# Patient Record
Sex: MALE | Race: BLACK OR AFRICAN AMERICAN | NOT HISPANIC OR LATINO
[De-identification: names, ages, dates, MRNs, and addresses within clinical notes are randomized per-mention and may not be internally consistent; named-entity substitution may affect disease eponyms.]

---

## 2017-07-11 ENCOUNTER — APPOINTMENT (OUTPATIENT)
Dept: HEART AND VASCULAR | Facility: CLINIC | Age: 72
End: 2017-07-11

## 2017-07-25 ENCOUNTER — APPOINTMENT (OUTPATIENT)
Dept: HEART AND VASCULAR | Facility: CLINIC | Age: 72
End: 2017-07-25

## 2017-08-08 ENCOUNTER — APPOINTMENT (OUTPATIENT)
Dept: HEART AND VASCULAR | Facility: CLINIC | Age: 72
End: 2017-08-08
Payer: MEDICARE

## 2017-08-08 ENCOUNTER — NON-APPOINTMENT (OUTPATIENT)
Age: 72
End: 2017-08-08

## 2017-08-08 VITALS
HEART RATE: 76 BPM | SYSTOLIC BLOOD PRESSURE: 138 MMHG | RESPIRATION RATE: 24 BRPM | BODY MASS INDEX: 23.19 KG/M2 | WEIGHT: 146 LBS | DIASTOLIC BLOOD PRESSURE: 100 MMHG | HEIGHT: 66.5 IN

## 2017-08-08 DIAGNOSIS — Z86.79 PERSONAL HISTORY OF OTHER DISEASES OF THE CIRCULATORY SYSTEM: ICD-10-CM

## 2017-08-08 DIAGNOSIS — F10.10 ALCOHOL ABUSE, UNCOMPLICATED: ICD-10-CM

## 2017-08-08 DIAGNOSIS — N18.2 CHRONIC KIDNEY DISEASE, STAGE 2 (MILD): ICD-10-CM

## 2017-08-08 DIAGNOSIS — J43.8 OTHER EMPHYSEMA: ICD-10-CM

## 2017-08-08 DIAGNOSIS — Z86.39 PERSONAL HISTORY OF OTHER ENDOCRINE, NUTRITIONAL AND METABOLIC DISEASE: ICD-10-CM

## 2017-08-08 DIAGNOSIS — N40.0 BENIGN PROSTATIC HYPERPLASIA WITHOUT LOWER URINARY TRACT SYMPMS: ICD-10-CM

## 2017-08-08 PROCEDURE — 99214 OFFICE O/P EST MOD 30 MIN: CPT | Mod: 25

## 2017-08-08 PROCEDURE — 93320 DOPPLER ECHO COMPLETE: CPT

## 2017-08-08 PROCEDURE — 93325 DOPPLER ECHO COLOR FLOW MAPG: CPT

## 2017-08-08 PROCEDURE — 93000 ELECTROCARDIOGRAM COMPLETE: CPT | Mod: 59

## 2017-08-08 PROCEDURE — 93351 STRESS TTE COMPLETE: CPT

## 2017-08-08 PROCEDURE — 93976 VASCULAR STUDY: CPT

## 2017-08-15 ENCOUNTER — APPOINTMENT (OUTPATIENT)
Dept: HEART AND VASCULAR | Facility: CLINIC | Age: 72
End: 2017-08-15

## 2017-09-26 ENCOUNTER — APPOINTMENT (OUTPATIENT)
Dept: HEART AND VASCULAR | Facility: CLINIC | Age: 72
End: 2017-09-26
Payer: MEDICARE

## 2017-09-26 VITALS
HEIGHT: 66 IN | RESPIRATION RATE: 20 BRPM | SYSTOLIC BLOOD PRESSURE: 136 MMHG | DIASTOLIC BLOOD PRESSURE: 84 MMHG | HEART RATE: 78 BPM

## 2017-09-26 PROCEDURE — 99214 OFFICE O/P EST MOD 30 MIN: CPT

## 2017-09-26 RX ORDER — HYDROCHLOROTHIAZIDE 25 MG/1
25 TABLET ORAL
Refills: 0 | Status: DISCONTINUED | COMMUNITY
End: 2017-09-26

## 2018-03-27 ENCOUNTER — APPOINTMENT (OUTPATIENT)
Dept: HEART AND VASCULAR | Facility: CLINIC | Age: 73
End: 2018-03-27

## 2018-06-12 ENCOUNTER — APPOINTMENT (OUTPATIENT)
Dept: HEART AND VASCULAR | Facility: CLINIC | Age: 73
End: 2018-06-12
Payer: MEDICARE

## 2018-06-12 VITALS — SYSTOLIC BLOOD PRESSURE: 144 MMHG | DIASTOLIC BLOOD PRESSURE: 88 MMHG | HEART RATE: 86 BPM | RESPIRATION RATE: 16 BRPM

## 2018-06-12 PROCEDURE — 99214 OFFICE O/P EST MOD 30 MIN: CPT

## 2018-12-11 ENCOUNTER — APPOINTMENT (OUTPATIENT)
Dept: HEART AND VASCULAR | Facility: CLINIC | Age: 73
End: 2018-12-11

## 2018-12-14 ENCOUNTER — APPOINTMENT (OUTPATIENT)
Dept: ORTHOPEDIC SURGERY | Facility: CLINIC | Age: 73
End: 2018-12-14
Payer: MEDICARE

## 2018-12-14 VITALS — WEIGHT: 150 LBS | BODY MASS INDEX: 24.11 KG/M2 | HEIGHT: 66 IN

## 2018-12-14 DIAGNOSIS — S82.832A OTHER FRACTURE OF UPPER AND LOWER END OF LEFT FIBULA, INITIAL ENCOUNTER FOR CLOSED FRACTURE: ICD-10-CM

## 2018-12-14 PROCEDURE — 99203 OFFICE O/P NEW LOW 30 MIN: CPT

## 2018-12-14 PROCEDURE — 73600 X-RAY EXAM OF ANKLE: CPT | Mod: LT

## 2022-05-09 ENCOUNTER — TRANSCRIPTION ENCOUNTER (OUTPATIENT)
Age: 77
End: 2022-05-09

## 2022-05-10 ENCOUNTER — APPOINTMENT (OUTPATIENT)
Dept: PULMONOLOGY | Facility: CLINIC | Age: 77
End: 2022-05-10

## 2022-06-19 ENCOUNTER — TRANSCRIPTION ENCOUNTER (OUTPATIENT)
Age: 77
End: 2022-06-19

## 2022-06-29 ENCOUNTER — TRANSCRIPTION ENCOUNTER (OUTPATIENT)
Age: 77
End: 2022-06-29

## 2022-07-20 ENCOUNTER — APPOINTMENT (OUTPATIENT)
Dept: PULMONOLOGY | Facility: CLINIC | Age: 77
End: 2022-07-20

## 2022-09-08 ENCOUNTER — APPOINTMENT (OUTPATIENT)
Dept: PULMONOLOGY | Facility: CLINIC | Age: 77
End: 2022-09-08

## 2022-09-08 VITALS
BODY MASS INDEX: 22.02 KG/M2 | HEIGHT: 66 IN | TEMPERATURE: 97 F | OXYGEN SATURATION: 98 % | WEIGHT: 137 LBS | SYSTOLIC BLOOD PRESSURE: 140 MMHG | DIASTOLIC BLOOD PRESSURE: 80 MMHG | HEART RATE: 77 BPM

## 2022-09-08 PROCEDURE — 99204 OFFICE O/P NEW MOD 45 MIN: CPT

## 2022-09-08 NOTE — HISTORY OF PRESENT ILLNESS
[FreeTextEntry1] : Evaluation for abnormal CAT scan and chest [de-identified] : This is a 77-year-old male who is a poor historian. He smoked as a young man but states he hasn't smoked for 50 years. He apparently had chest surgery several years ago in [place]. This may have been a left lower lobectomy. He is unsure of the details. He is not sure what they found. In April of this year he was hospitalized at [facility]. He is unclear as to why he was in the hospital. Apparently he had a CAT scan of the chest which revealed a 5 cm fluid-filled cavity in the right midlung field adjacent to the minor fissure. He apparently was not given antibiotics. He is unclear as to what happened since then. He complains of mucus in his chest and difficulty raising mucus. He gets short of breath walking up inclines. He occasionally wheezes. He would like medication for the mucus in his chest. He again is a poor historian unable to give me any accurate history as to what gone on since April. Past medical history hypertension. Medications he does not know the name. He denies alcohol use. He is a retired cook.

## 2022-09-08 NOTE — ASSESSMENT
[FreeTextEntry1] : Patient is a poor historian. I reviewed the CAT scan dated April 9, 2022. There is a 5 cm fluid-filled cavity adjacent to the minor fissure on the right. I doubt this represents an infectious process. Patient clearly requires a repeat CAT scan. In terms of his complaint chronic mucus production I will try using breo and see if that helps him. He will also be referred for PFT testing to better evaluate lung function. I will see patient again in one month for followup at that time.

## 2022-09-27 ENCOUNTER — RESULT REVIEW (OUTPATIENT)
Age: 77
End: 2022-09-27

## 2022-10-10 ENCOUNTER — APPOINTMENT (OUTPATIENT)
Dept: PULMONOLOGY | Facility: CLINIC | Age: 77
End: 2022-10-10

## 2022-10-10 VITALS
HEART RATE: 95 BPM | SYSTOLIC BLOOD PRESSURE: 130 MMHG | BODY MASS INDEX: 22.02 KG/M2 | HEIGHT: 66 IN | TEMPERATURE: 96.5 F | DIASTOLIC BLOOD PRESSURE: 80 MMHG | OXYGEN SATURATION: 98 % | WEIGHT: 137 LBS

## 2022-10-10 DIAGNOSIS — R93.89 ABNORMAL FINDINGS ON DIAGNOSTIC IMAGING OF OTHER SPECIFIED BODY STRUCTURES: ICD-10-CM

## 2022-10-10 PROCEDURE — 99214 OFFICE O/P EST MOD 30 MIN: CPT

## 2022-10-10 RX ORDER — UMECLIDINIUM BROMIDE AND VILANTEROL TRIFENATATE 62.5; 25 UG/1; UG/1
62.5-25 POWDER RESPIRATORY (INHALATION)
Qty: 1 | Refills: 3 | Status: ACTIVE | COMMUNITY
Start: 2022-10-10 | End: 1900-01-01

## 2022-10-10 NOTE — ASSESSMENT
[FreeTextEntry1] : Patient with marked resolution of the cavitary lesion in the right midlung field. The etiology of this lesion remains unclear but it has markedly improved. The restriction on PFT testing may be due to 2 prior chest surgery with a possible left lower lobectomy. Patient is unclear of the details of this surgery. The persistent symptom of mucus in the throat region and some unclear etiology. Will try anoro inhalation. Patient may also consider an ENT evaluation. Patient is to return to see me in 2 months. We'll recommend a repeat CAT scan in 6 months.

## 2022-10-10 NOTE — HISTORY OF PRESENT ILLNESS
[FreeTextEntry1] : One followup CAT scan and shortness of breath [de-identified] : Patient had repeat CAT scan of the chest which reveals marked resolution of the 5 cm cavitary lesion in the right midlung field. Patient is on Breo. PFT reveals moderate restriction with a marked reduction in diffusion capacity to 47% of predicted. Patient has a history of left chest surgery which may be a left lower lobectomy. Patient is unclear of the details of the surgery. He states it was for a spot in the lung but was not tense. He states it was done in [facility] but he doesn't know exactly where. Patient is again a poor historian. He continues to complain of mucus in his throat region. He denies excess cough. He denies nasal congestion. He still has moderate shortness of breath walking a block.

## 2022-12-12 ENCOUNTER — APPOINTMENT (OUTPATIENT)
Dept: PULMONOLOGY | Facility: CLINIC | Age: 77
End: 2022-12-12

## 2022-12-12 VITALS
BODY MASS INDEX: 22.6 KG/M2 | DIASTOLIC BLOOD PRESSURE: 60 MMHG | TEMPERATURE: 97.5 F | HEART RATE: 89 BPM | HEIGHT: 67 IN | SYSTOLIC BLOOD PRESSURE: 150 MMHG | WEIGHT: 144 LBS | RESPIRATION RATE: 18 BRPM | OXYGEN SATURATION: 96 %

## 2022-12-12 DIAGNOSIS — R06.02 SHORTNESS OF BREATH: ICD-10-CM

## 2022-12-12 PROCEDURE — 99214 OFFICE O/P EST MOD 30 MIN: CPT

## 2022-12-12 NOTE — HISTORY OF PRESENT ILLNESS
[FreeTextEntry1] : Mucous in the throat region. [de-identified] : Patient continues to complain of mucus in the throat region. He has an occasional cough to do a tickle in her throat. He denies nasal complaints. Of note the patient is on lisinopril for blood pressure. He continues to complain of one block dyspnea on exertion. There is no obvious change with anoro. He denies weight loss or anorexia. His main complaint is her feeling of mucus in the throat region. It's unclear if this started prior to the use of lisinopril.

## 2022-12-12 NOTE — ASSESSMENT
[FreeTextEntry1] : I wonder if the patient's throat complaints are due to the use of lisinopril. The patient will be seen in ENT the end of the week. I would consider changing lisinopril to a different agent. Patient will return in March. He requires a repeat CAT scan of the chest in March or April of next year.

## 2022-12-27 ENCOUNTER — APPOINTMENT (OUTPATIENT)
Dept: HEART AND VASCULAR | Facility: CLINIC | Age: 77
End: 2022-12-27

## 2022-12-27 ENCOUNTER — NON-APPOINTMENT (OUTPATIENT)
Age: 77
End: 2022-12-27

## 2022-12-27 VITALS
DIASTOLIC BLOOD PRESSURE: 89 MMHG | TEMPERATURE: 98 F | BODY MASS INDEX: 23.39 KG/M2 | SYSTOLIC BLOOD PRESSURE: 148 MMHG | OXYGEN SATURATION: 97 % | RESPIRATION RATE: 16 BRPM | WEIGHT: 149 LBS | HEIGHT: 67 IN | HEART RATE: 72 BPM

## 2022-12-27 DIAGNOSIS — R00.2 PALPITATIONS: ICD-10-CM

## 2022-12-27 PROCEDURE — 99214 OFFICE O/P EST MOD 30 MIN: CPT | Mod: 25

## 2022-12-27 PROCEDURE — 93000 ELECTROCARDIOGRAM COMPLETE: CPT

## 2022-12-27 RX ORDER — ATORVASTATIN CALCIUM 80 MG/1
TABLET, FILM COATED ORAL
Refills: 0 | Status: DISCONTINUED | COMMUNITY
End: 2022-12-27

## 2022-12-27 RX ORDER — AMOXICILLIN 500 MG/1
CAPSULE ORAL
Refills: 0 | Status: DISCONTINUED | COMMUNITY
End: 2022-12-27

## 2022-12-27 NOTE — PHYSICAL EXAM
[General Appearance - Well Developed] : well developed [Normal Appearance] : normal appearance [Well Groomed] : well groomed [General Appearance - Well Nourished] : well nourished [No Deformities] : no deformities [General Appearance - In No Acute Distress] : no acute distress [Normal Conjunctiva] : the conjunctiva exhibited no abnormalities [Eyelids - No Xanthelasma] : the eyelids demonstrated no xanthelasmas [Normal Oral Mucosa] : normal oral mucosa [No Oral Pallor] : no oral pallor [No Oral Cyanosis] : no oral cyanosis [Normal Jugular Venous A Waves Present] : normal jugular venous A waves present [Normal Jugular Venous V Waves Present] : normal jugular venous V waves present [No Jugular Venous Bolivar A Waves] : no jugular venous bolivar A waves [Respiration, Rhythm And Depth] : normal respiratory rhythm and effort [Exaggerated Use Of Accessory Muscles For Inspiration] : no accessory muscle use [Auscultation Breath Sounds / Voice Sounds] : lungs were clear to auscultation bilaterally [Heart Rate And Rhythm] : heart rate and rhythm were normal [Heart Sounds] : normal S1 and S2 [Murmurs] : no murmurs present [Abdomen Soft] : soft [Abdomen Tenderness] : non-tender [Abdomen Mass (___ Cm)] : no abdominal mass palpated [Nail Clubbing] : no clubbing of the fingernails [Cyanosis, Localized] : no localized cyanosis [Petechial Hemorrhages (___cm)] : no petechial hemorrhages [] : no ischemic changes [Oriented To Time, Place, And Person] : oriented to person, place, and time [Impaired Insight] : insight and judgment were intact [Affect] : the affect was normal [Mood] : the mood was normal

## 2022-12-27 NOTE — DISCUSSION/SUMMARY
[Hyperlipidemia] : hyperlipidemia [Hypertension] : hypertension [Stable] : stable [Possible Cardiac Ischemia (Intermd Prob)] : possible cardiac ischemia (intermediate probability) [Echocardiogram] : echocardiogram [Stress Echocardiogram] : stress echocardiogram [Thyroid Function Tests] : thyroid function tests [Holter Monitor] : a Holter monitor [de-identified] : dyspnea on exertion [de-identified] : elevated [de-identified] : ? thumping sensation in chest [de-identified] : 1 week jolenetch [FreeTextEntry4] : Ascending aortic aneurysm - stable as of Oct 2022. Patient to bring in medications so we can have an accurate list of medications [FreeTextEntry3] : after testing completed

## 2022-12-27 NOTE — REASON FOR VISIT
[Follow-Up - Clinic] : a clinic follow-up of [FreeTextEntry1] : 77 year old male with past medical history of HTN, partial lobectomy October 2013. Last seen in 2018. Here for followup. He has had a productive whitish cough for many years. He has occasional dyspnea on exertion.  He denies chest pain, dizziness, lightheadness, LOC. Occasional thumping sensation in chest when hears something loud. Unclear of medication list. \par \par EKG Oct 2022" NSR at 89 bpm with RBBB. LVH - no change from prior\par \par Labs 03/23/2022: GFR 53; Cr 1.29; CBC WNL; rest of CMP WNL\par \par EXSE  Aug 8th 2017: 6 min; No EKG changes; No WMA; Resting echo: Normal LVEF 55%; Mild to moderate AR; Borderline dilated aortic root 4.0 cm; No other significant valvular abnormalities. \par U/s Abd: No abd Aortic aneurysm; Normal Abdominal aorta dimensions. \par \par EKG 08/2017: NSR at 76 bpm with RBBB. LVH - no change from prior\par \par Oct 2022 CT: No change in diffuse tortuousity and ectasia of the aorta with fusiform aneurysmal dilatation of the ascending aorta up to 4.6 cm AP. CAT scan of the chest which reveals marked resolution of the 5 cm cavitary lesion in the right midlung field. Patient is on Breo. To get repeat CT scan in 6 months per Pulm. \par \par PFT reveals moderate restriction with a marked reduction in diffusion capacity to 47% of predicted\par \par Of note, CT chest states mild dilatation of thoracic ascending aorta (4.4 cm); however PMD notes state abd aortic aneurysm.\par \par Labs June 2017: A1C 5.6; Total chol 141; ; HDL 45 LDL 61; CMP WNL\par \par EXSE Oct 2013: Exercised for 8 minutes and 12 seconds on a Thang protocol. No significant EKG changes noted and no wall motion abnormalities noted on stress echocardiogram. \par \par Resting echocardiogram showed Mild to moderate Aortic Regurgitation (ERO 0.04 sq cm), RF 12%, and RVol of 9 mL, VC 0.3 cm, Mild TR, Mild MR, No ND, PASP 33 mmHg, and Grade 1 Diastolic Dysfunction. Normal LV systolic function with LVEF estimated to be 55-60%.

## 2023-01-18 ENCOUNTER — APPOINTMENT (OUTPATIENT)
Dept: HEART AND VASCULAR | Facility: CLINIC | Age: 78
End: 2023-01-18
Payer: MEDICARE

## 2023-01-18 VITALS
BODY MASS INDEX: 23.39 KG/M2 | HEIGHT: 67 IN | OXYGEN SATURATION: 98 % | WEIGHT: 149 LBS | SYSTOLIC BLOOD PRESSURE: 142 MMHG | HEART RATE: 85 BPM | DIASTOLIC BLOOD PRESSURE: 90 MMHG

## 2023-01-18 PROCEDURE — 93325 DOPPLER ECHO COLOR FLOW MAPG: CPT

## 2023-01-18 PROCEDURE — 93320 DOPPLER ECHO COMPLETE: CPT

## 2023-01-18 PROCEDURE — 93351 STRESS TTE COMPLETE: CPT

## 2023-01-24 ENCOUNTER — APPOINTMENT (OUTPATIENT)
Dept: HEART AND VASCULAR | Facility: CLINIC | Age: 78
End: 2023-01-24
Payer: MEDICARE

## 2023-01-24 VITALS
DIASTOLIC BLOOD PRESSURE: 84 MMHG | BODY MASS INDEX: 22.6 KG/M2 | WEIGHT: 144 LBS | HEIGHT: 67 IN | OXYGEN SATURATION: 98 % | SYSTOLIC BLOOD PRESSURE: 146 MMHG | TEMPERATURE: 97.6 F | HEART RATE: 84 BPM | RESPIRATION RATE: 16 BRPM

## 2023-01-24 PROCEDURE — 99214 OFFICE O/P EST MOD 30 MIN: CPT

## 2023-01-24 RX ORDER — ISONIAZID 300 MG/1
300 TABLET ORAL
Refills: 0 | Status: DISCONTINUED | COMMUNITY
End: 2023-01-24

## 2023-01-24 RX ORDER — HYDROCHLOROTHIAZIDE 12.5 MG/1
TABLET ORAL
Refills: 0 | Status: DISCONTINUED | COMMUNITY
End: 2023-01-24

## 2023-01-24 NOTE — REASON FOR VISIT
[Follow-Up - Clinic] : a clinic follow-up of [FreeTextEntry1] : 77 year old male with past medical history of HTN, partial lobectomy October 2013. Here for followup after recent testing. EXSE/Zio XT reviewed. He feels well from a [facility] standpoint. Just complains of mucus buildup in throat. To get cataract surgery Jan 30 and Feb 15th. \par \par ZioXT 12/27/2022 - 12/30/2022: SR at average HR of 79 bpm with RBBB. Occasional SVE/VE. \par \par EXSE 01/18/2023: Thang protocol 4:00 min. No EKG changes and No WMA on post exercise images. Reached 104% of MPHR. \par TTE 01/18/2023: LVEF 51%. Mild LVH. Normal RV size/systolic function. Mild to moderate AR. Mildly dilated Aortic root measuring 3.9 cm and mildly dilated asending aorta measuring 3.8 cm. RVSP 23 mm Hg. Trace TR. Trace WV. \par \par EKG Oct 2022: NSR at 89 bpm with RBBB. LVH - no change from prior\par \par Labs 03/23/2022: GFR 53; Cr 1.29; CBC WNL; rest of CMP WNL\par \par EXSE Aug 8th 2017: 6 min; No EKG changes; No WMA; Resting echo: Normal LVEF 55%; Mild to moderate AR; Borderline dilated aortic root 4.0 cm; No other significant valvular abnormalities. \par U/s Abd: No abd Aortic aneurysm; Normal Abdominal aorta dimensions. \par \par EKG 08/2017: NSR at 76 bpm with RBBB. LVH - no change from prior\par \par Oct 2022 CT: No change in diffuse tortuousity and ectasia of the aorta with fusiform aneurysmal dilatation of the ascending aorta up to 4.6 cm AP. CAT scan of the chest which reveals marked resolution of the 5 cm cavitary lesion in the right midlung field. Patient is on Breo. To get repeat CT scan in 6 months per Pulm. \par \par PFT reveals moderate restriction with a marked reduction in diffusion capacity to 47% of predicted\par \par Of note, CT chest states mild dilatation of thoracic ascending aorta (4.4 cm); however PMD notes state abd aortic aneurysm.\par \par Labs June 2017: A1C 5.6; Total chol 141; ; HDL 45 LDL 61; CMP WNL\par \par EXSE Oct 2013: Exercised for 8 minutes and 12 seconds on a Thang protocol. No significant EKG changes noted and no wall motion abnormalities noted on stress echocardiogram. \par \par Resting echocardiogram showed Mild to moderate Aortic Regurgitation (ERO 0.04 sq cm), RF 12%, and RVol of 9 mL, VC 0.3 cm, Mild TR, Mild MR, No WV, PASP 33 mmHg, and Grade 1 Diastolic Dysfunction. Normal LV systolic function with LVEF estimated to be 55-60%.

## 2023-01-24 NOTE — DISCUSSION/SUMMARY
[Unlikely Cardiac Ischemia (Low Prob.)] : chest pain unlikely to represent cardiac ischemia (low probability) [Hyperlipidemia] : hyperlipidemia [Hypertension] : hypertension [Stable] : stable [Low Sodium Diet] : low sodium diet [___ Month(s)] : in [unfilled] month(s) [de-identified] : resolved [de-identified] : On Atorvastatin 20 mg Po daily [de-identified] : slightly elevated, but acceptable [de-identified] : On Lisinopril and HCTZ - to call with medication dosages tomorrow [de-identified] : resolved.  [FreeTextEntry4] : Ascending aortic aneurysm - stable. Patient to call tomorrow with his aid Karley to clarify medications [FreeTextEntry1] : [facility] stable.

## 2023-01-25 ENCOUNTER — NON-APPOINTMENT (OUTPATIENT)
Age: 78
End: 2023-01-25

## 2023-01-25 RX ORDER — LOSARTAN POTASSIUM 50 MG/1
50 TABLET, FILM COATED ORAL DAILY
Refills: 0 | Status: ACTIVE | COMMUNITY

## 2023-01-25 RX ORDER — ALBUTEROL 90 MCG
AEROSOL (GRAM) INHALATION
Refills: 0 | Status: ACTIVE | COMMUNITY

## 2023-01-30 ENCOUNTER — HOSPITAL ENCOUNTER (OUTPATIENT)
Dept: HOSPITAL 74 - FASU | Age: 78
Discharge: HOME | End: 2023-01-30
Attending: OPHTHALMOLOGY
Payer: COMMERCIAL

## 2023-01-30 VITALS — DIASTOLIC BLOOD PRESSURE: 89 MMHG | SYSTOLIC BLOOD PRESSURE: 167 MMHG | HEART RATE: 70 BPM

## 2023-01-30 VITALS — RESPIRATION RATE: 18 BRPM | TEMPERATURE: 97.9 F

## 2023-01-30 VITALS — BODY MASS INDEX: 22.5 KG/M2

## 2023-01-30 DIAGNOSIS — H25.12: Primary | ICD-10-CM

## 2023-01-30 PROCEDURE — V2632 POST CHMBR INTRAOCULAR LENS: HCPCS

## 2023-01-30 PROCEDURE — 08RK3JZ REPLACEMENT OF LEFT LENS WITH SYNTHETIC SUBSTITUTE, PERCUTANEOUS APPROACH: ICD-10-PCS | Performed by: STUDENT IN AN ORGANIZED HEALTH CARE EDUCATION/TRAINING PROGRAM

## 2023-01-30 PROCEDURE — 66984 XCAPSL CTRC RMVL W/O ECP: CPT

## 2023-01-30 RX ADMIN — OFLOXACIN SCH DROP: 3 SOLUTION/ DROPS OPHTHALMIC at 09:00

## 2023-01-30 RX ADMIN — TROPICAMIDE SCH DROP: 10 SOLUTION/ DROPS OPHTHALMIC at 09:00

## 2023-01-30 RX ADMIN — OFLOXACIN SCH DROP: 3 SOLUTION/ DROPS OPHTHALMIC at 09:10

## 2023-01-30 RX ADMIN — CYCLOPENTOLATE HYDROCHLORIDE SCH DROP: 10 SOLUTION/ DROPS OPHTHALMIC at 09:00

## 2023-01-30 RX ADMIN — PHENYLEPHRINE HYDROCHLORIDE SCH DROP: 0.25 SPRAY NASAL at 09:10

## 2023-01-30 RX ADMIN — KETOROLAC TROMETHAMINE SCH DROP: 5 SOLUTION OPHTHALMIC at 09:00

## 2023-01-30 RX ADMIN — CYCLOPENTOLATE HYDROCHLORIDE SCH DROP: 10 SOLUTION/ DROPS OPHTHALMIC at 09:10

## 2023-01-30 RX ADMIN — KETOROLAC TROMETHAMINE SCH DROP: 5 SOLUTION OPHTHALMIC at 09:10

## 2023-01-30 RX ADMIN — PHENYLEPHRINE HYDROCHLORIDE SCH DROP: 0.25 SPRAY NASAL at 09:00

## 2023-01-30 RX ADMIN — PHENYLEPHRINE HYDROCHLORIDE SCH DROP: 0.25 SPRAY NASAL at 09:05

## 2023-01-30 RX ADMIN — TROPICAMIDE SCH DROP: 10 SOLUTION/ DROPS OPHTHALMIC at 09:10

## 2023-01-30 RX ADMIN — KETOROLAC TROMETHAMINE SCH DROP: 5 SOLUTION OPHTHALMIC at 09:05

## 2023-01-30 RX ADMIN — TROPICAMIDE SCH DROP: 10 SOLUTION/ DROPS OPHTHALMIC at 09:05

## 2023-01-30 RX ADMIN — OFLOXACIN SCH DROP: 3 SOLUTION/ DROPS OPHTHALMIC at 09:05

## 2023-02-13 ENCOUNTER — HOSPITAL ENCOUNTER (OUTPATIENT)
Dept: HOSPITAL 74 - FASU | Age: 78
Discharge: HOME | End: 2023-02-13
Attending: OPHTHALMOLOGY
Payer: COMMERCIAL

## 2023-02-13 VITALS — TEMPERATURE: 97.9 F

## 2023-02-13 VITALS — BODY MASS INDEX: 22.5 KG/M2

## 2023-02-13 VITALS — RESPIRATION RATE: 18 BRPM | DIASTOLIC BLOOD PRESSURE: 96 MMHG | SYSTOLIC BLOOD PRESSURE: 178 MMHG | HEART RATE: 67 BPM

## 2023-02-13 DIAGNOSIS — H25.11: Primary | ICD-10-CM

## 2023-02-13 PROCEDURE — V2632 POST CHMBR INTRAOCULAR LENS: HCPCS

## 2023-02-13 PROCEDURE — 66984 XCAPSL CTRC RMVL W/O ECP: CPT

## 2023-02-13 PROCEDURE — 08RJ3JZ REPLACEMENT OF RIGHT LENS WITH SYNTHETIC SUBSTITUTE, PERCUTANEOUS APPROACH: ICD-10-PCS | Performed by: OPHTHALMOLOGY

## 2023-02-13 RX ADMIN — PHENYLEPHRINE HYDROCHLORIDE SCH DROP: 0.25 SPRAY NASAL at 09:15

## 2023-02-13 RX ADMIN — OFLOXACIN SCH DROP: 3 SOLUTION/ DROPS OPHTHALMIC at 09:10

## 2023-02-13 RX ADMIN — PHENYLEPHRINE HYDROCHLORIDE SCH DROP: 0.25 SPRAY NASAL at 09:05

## 2023-02-13 RX ADMIN — KETOROLAC TROMETHAMINE SCH DROP: 5 SOLUTION OPHTHALMIC at 09:10

## 2023-02-13 RX ADMIN — KETOROLAC TROMETHAMINE SCH DROP: 5 SOLUTION OPHTHALMIC at 09:05

## 2023-02-13 RX ADMIN — CYCLOPENTOLATE HYDROCHLORIDE SCH DROP: 10 SOLUTION/ DROPS OPHTHALMIC at 09:05

## 2023-02-13 RX ADMIN — PHENYLEPHRINE HYDROCHLORIDE SCH DROP: 0.25 SPRAY NASAL at 09:10

## 2023-02-13 RX ADMIN — TROPICAMIDE SCH DROP: 10 SOLUTION/ DROPS OPHTHALMIC at 09:05

## 2023-02-13 RX ADMIN — OFLOXACIN SCH DROP: 3 SOLUTION/ DROPS OPHTHALMIC at 09:05

## 2023-02-13 RX ADMIN — TROPICAMIDE SCH DROP: 10 SOLUTION/ DROPS OPHTHALMIC at 09:15

## 2023-02-13 RX ADMIN — CYCLOPENTOLATE HYDROCHLORIDE SCH DROP: 10 SOLUTION/ DROPS OPHTHALMIC at 09:10

## 2023-02-13 RX ADMIN — OFLOXACIN SCH DROP: 3 SOLUTION/ DROPS OPHTHALMIC at 09:15

## 2023-02-13 RX ADMIN — KETOROLAC TROMETHAMINE SCH DROP: 5 SOLUTION OPHTHALMIC at 09:15

## 2023-02-13 RX ADMIN — TROPICAMIDE SCH DROP: 10 SOLUTION/ DROPS OPHTHALMIC at 09:10

## 2023-02-13 RX ADMIN — CYCLOPENTOLATE HYDROCHLORIDE SCH DROP: 10 SOLUTION/ DROPS OPHTHALMIC at 09:15

## 2023-03-13 ENCOUNTER — APPOINTMENT (OUTPATIENT)
Dept: PULMONOLOGY | Facility: CLINIC | Age: 78
End: 2023-03-13
Payer: MEDICARE

## 2023-03-13 VITALS
OXYGEN SATURATION: 95 % | HEART RATE: 102 BPM | SYSTOLIC BLOOD PRESSURE: 152 MMHG | DIASTOLIC BLOOD PRESSURE: 98 MMHG | HEIGHT: 67 IN | TEMPERATURE: 96.6 F | WEIGHT: 146 LBS | BODY MASS INDEX: 22.91 KG/M2

## 2023-03-13 PROCEDURE — 99214 OFFICE O/P EST MOD 30 MIN: CPT

## 2023-03-13 NOTE — HISTORY OF PRESENT ILLNESS
[FreeTextEntry1] : Chronic throat irritation. [de-identified] :   Patient is now off lisinopril and has persistent complaint of irritation in his throat with mucus in the throat which she has trouble removing.  It is unclear if he has a true cough.  He denies nasal complaints.  He was referred to ENT but it is unclear if he actually went or not.  He still has shortness of breath walking a block but states it is 2 to problems in his throat.

## 2023-03-13 NOTE — ASSESSMENT
[FreeTextEntry1] :   Patient continues to complain of throat irritation and mucus in the throat.  I do not see an obvious pulmonary etiology of his complaints.  I will try to reach out to ENT to speak to them.  If the patient was not seen he will need to be reevaluated.

## 2023-03-13 NOTE — PHYSICAL EXAM
[No Acute Distress] : no acute distress [Well Nourished] : well nourished [Well Developed] : well developed [Well-Appearing] : well-appearing [Normal Sclera/Conjunctiva] : normal sclera/conjunctiva [PERRL] : pupils equal round and reactive to light [EOMI] : extraocular movements intact [Normal Outer Ear/Nose] : the outer ears and nose were normal in appearance [Normal Oropharynx] : the oropharynx was normal [No JVD] : no jugular venous distention [No Lymphadenopathy] : no lymphadenopathy [Supple] : supple [Thyroid Normal, No Nodules] : the thyroid was normal and there were no nodules present [No Respiratory Distress] : no respiratory distress  [No Accessory Muscle Use] : no accessory muscle use [Clear to Auscultation] : lungs were clear to auscultation bilaterally [Normal Rate] : normal rate  [Regular Rhythm] : with a regular rhythm [Normal S1, S2] : normal S1 and S2 [No Murmur] : no murmur heard [No Carotid Bruits] : no carotid bruits [No Abdominal Bruit] : a ~M bruit was not heard ~T in the abdomen [No Varicosities] : no varicosities [Pedal Pulses Present] : the pedal pulses are present [No Edema] : there was no peripheral edema [No Palpable Aorta] : no palpable aorta [No Extremity Clubbing/Cyanosis] : no extremity clubbing/cyanosis [Soft] : abdomen soft [Non Tender] : non-tender [Non-distended] : non-distended [No Masses] : no abdominal mass palpated [No HSM] : no HSM [Normal Bowel Sounds] : normal bowel sounds [No Focal Deficits] : no focal deficits [Normal Gait] : normal gait [Normal Affect] : the affect was normal [Normal Insight/Judgement] : insight and judgment were intact

## 2023-07-11 ENCOUNTER — APPOINTMENT (OUTPATIENT)
Dept: HEART AND VASCULAR | Facility: CLINIC | Age: 78
End: 2023-07-11

## 2023-08-22 ENCOUNTER — APPOINTMENT (OUTPATIENT)
Dept: HEART AND VASCULAR | Facility: CLINIC | Age: 78
End: 2023-08-22
Payer: MEDICARE

## 2023-08-22 VITALS
DIASTOLIC BLOOD PRESSURE: 85 MMHG | BODY MASS INDEX: 22.6 KG/M2 | TEMPERATURE: 97.6 F | HEART RATE: 80 BPM | HEIGHT: 67 IN | WEIGHT: 144 LBS | RESPIRATION RATE: 16 BRPM | SYSTOLIC BLOOD PRESSURE: 134 MMHG | OXYGEN SATURATION: 97 %

## 2023-08-22 DIAGNOSIS — I71.60 THORACOABDOMINAL AORTIC ANEURYSM, WITHOUT RUPTURE, UNSPECIFIED: ICD-10-CM

## 2023-08-22 PROCEDURE — 99214 OFFICE O/P EST MOD 30 MIN: CPT

## 2023-08-22 NOTE — REASON FOR VISIT
[Follow-Up - Clinic] : a clinic follow-up of [FreeTextEntry1] : 78 year old male with past medical history of HTN, partial lobectomy October 2013. He complains of mucous in his chest. No chest pain. Occasional dyspnea on exertion but thinks mainly from mucous. No orthopnea, PND, edema.  ZioXT 12/27/2022 - 12/30/2022: SR at average HR of 79 bpm with RBBB. Occasional SVE/VE.   EXSE 01/18/2023: Thang protocol 4:00 min. No EKG changes and No WMA on post exercise images. Reached 104% of MPHR.  TTE 01/18/2023: LVEF 51%. Mild LVH. Normal RV size/systolic function. Mild to moderate AR. Mildly dilated Aortic root measuring 3.9 cm and mildly dilated asending aorta measuring 3.8 cm. RVSP 23 mm Hg. Trace TR. Trace UT.   EKG Oct 2022: NSR at 89 bpm with RBBB. LVH - no change from prior  Labs 03/23/2022: GFR 53; Cr 1.29; CBC WNL; rest of CMP WNL  EXSE Aug 8th 2017: 6 min; No EKG changes; No WMA; Resting echo: Normal LVEF 55%; Mild to moderate AR; Borderline dilated aortic root 4.0 cm; No other significant valvular abnormalities.  U/s Abd: No abd Aortic aneurysm; Normal Abdominal aorta dimensions.   EKG 08/2017: NSR at 76 bpm with RBBB. LVH - no change from prior  Oct 2022 CT: No change in diffuse tortuousity and ectasia of the aorta with fusiform aneurysmal dilatation of the ascending aorta up to 4.6 cm AP. CAT scan of the chest which reveals marked resolution of the 5 cm cavitary lesion in the right midlung field. Patient is on Breo. To get repeat CT scan in 6 months per Pulm.   PFT reveals moderate restriction with a marked reduction in diffusion capacity to 47% of predicted  Of note, CT chest states mild dilatation of thoracic ascending aorta (4.4 cm); however PMD notes state abd aortic aneurysm.  Labs June 2017: A1C 5.6; Total chol 141; ; HDL 45 LDL 61; CMP WNL  EXSE Oct 2013: Exercised for 8 minutes and 12 seconds on a Thang protocol. No significant EKG changes noted and no wall motion abnormalities noted on stress echocardiogram.   Resting echocardiogram showed Mild to moderate Aortic Regurgitation (ERO 0.04 sq cm), RF 12%, and RVol of 9 mL, VC 0.3 cm, Mild TR, Mild MR, No UT, PASP 33 mmHg, and Grade 1 Diastolic Dysfunction. Normal LV systolic function with LVEF estimated to be 55-60%.

## 2023-08-22 NOTE — DISCUSSION/SUMMARY
[Unlikely Cardiac Ischemia (Low Prob.)] : chest pain unlikely to represent cardiac ischemia (low probability) [Hyperlipidemia] : hyperlipidemia [Hypertension] : hypertension [Stable] : stable [Low Sodium Diet] : low sodium diet [___ Month(s)] : in [unfilled] month(s) [Diet Modification] : diet modification [Exercise] : exercise [de-identified] : resolved [de-identified] : As per patient, not taking statin.  [de-identified] : slightly elevated, but acceptable [de-identified] : On Losartan 50 mg PO daily [de-identified] : resolved.  [FreeTextEntry4] : Ascending aortic aneurysm - stable. Patient said he had a CT scan in January 2023. Will obtain from PMD. If has not been done, will need CT Aorta to assess stability of aneurysm. Patient to get labs done this week on Wednesday. Will obtain from PMD. Will also give Rx for labs.

## 2024-02-07 ENCOUNTER — RX RENEWAL (OUTPATIENT)
Age: 79
End: 2024-02-07

## 2024-02-07 RX ORDER — UMECLIDINIUM BROMIDE AND VILANTEROL TRIFENATATE 62.5; 25 UG/1; UG/1
62.5-25 POWDER RESPIRATORY (INHALATION)
Qty: 1 | Refills: 5 | Status: ACTIVE | COMMUNITY
Start: 2023-02-21 | End: 1900-01-01

## 2024-03-05 ENCOUNTER — APPOINTMENT (OUTPATIENT)
Dept: HEART AND VASCULAR | Facility: CLINIC | Age: 79
End: 2024-03-05
Payer: MEDICARE

## 2024-03-05 ENCOUNTER — NON-APPOINTMENT (OUTPATIENT)
Age: 79
End: 2024-03-05

## 2024-03-05 VITALS
BODY MASS INDEX: 23.54 KG/M2 | SYSTOLIC BLOOD PRESSURE: 144 MMHG | RESPIRATION RATE: 16 BRPM | HEIGHT: 67 IN | DIASTOLIC BLOOD PRESSURE: 85 MMHG | HEART RATE: 78 BPM | TEMPERATURE: 98.3 F | OXYGEN SATURATION: 98 % | WEIGHT: 150 LBS

## 2024-03-05 PROCEDURE — 93000 ELECTROCARDIOGRAM COMPLETE: CPT

## 2024-03-05 PROCEDURE — 99214 OFFICE O/P EST MOD 30 MIN: CPT | Mod: 25

## 2024-03-05 NOTE — DISCUSSION/SUMMARY
[Unlikely Cardiac Ischemia (Low Prob.)] : chest pain unlikely to represent cardiac ischemia (low probability) [Hyperlipidemia] : hyperlipidemia [Diet Modification] : diet modification [Exercise] : exercise [Hypertension] : hypertension [Stable] : stable [Low Sodium Diet] : low sodium diet [___ Month(s)] : in [unfilled] month(s) [de-identified] : resolved [de-identified] : slightly elevated, but acceptable [de-identified] : As per patient, not taking statin.  [de-identified] : resolved.  [de-identified] : On Losartan 50 mg PO daily [FreeTextEntry4] : Ascending aortic aneurysm - stable. Patient said he had a CT scan in January 2023. Will obtain from PMD. If has not been done, will need CT Aorta to assess stability of aneurysm. Patient to get labs done this week on Wednesday. Will obtain from PMD. Will also give Rx for labs.  [FreeTextEntry1] : Patient needs to see Pulmonologist again. Will help patient make appointment. He will need CT Chest based on last CT Chest.  Had labs done one week ago - Will followup on labs done at [facility].

## 2024-03-05 NOTE — PHYSICAL EXAM
[General Appearance - Well Developed] : well developed [Well Groomed] : well groomed [Normal Appearance] : normal appearance [General Appearance - Well Nourished] : well nourished [General Appearance - In No Acute Distress] : no acute distress [No Deformities] : no deformities [Normal Conjunctiva] : the conjunctiva exhibited no abnormalities [Eyelids - No Xanthelasma] : the eyelids demonstrated no xanthelasmas [Normal Oral Mucosa] : normal oral mucosa [No Oral Pallor] : no oral pallor [Normal Jugular Venous A Waves Present] : normal jugular venous A waves present [No Oral Cyanosis] : no oral cyanosis [No Jugular Venous Bolivar A Waves] : no jugular venous bolivar A waves [Normal Jugular Venous V Waves Present] : normal jugular venous V waves present [Exaggerated Use Of Accessory Muscles For Inspiration] : no accessory muscle use [Respiration, Rhythm And Depth] : normal respiratory rhythm and effort [Heart Rate And Rhythm] : heart rate and rhythm were normal [Auscultation Breath Sounds / Voice Sounds] : lungs were clear to auscultation bilaterally [Murmurs] : no murmurs present [Heart Sounds] : normal S1 and S2 [Abdomen Soft] : soft [Abdomen Tenderness] : non-tender [Nail Clubbing] : no clubbing of the fingernails [Abdomen Mass (___ Cm)] : no abdominal mass palpated [Petechial Hemorrhages (___cm)] : no petechial hemorrhages [Cyanosis, Localized] : no localized cyanosis [Oriented To Time, Place, And Person] : oriented to person, place, and time [] : no ischemic changes [Mood] : the mood was normal [Affect] : the affect was normal [Impaired Insight] : insight and judgment were intact

## 2024-03-05 NOTE — REVIEW OF SYSTEMS
[Negative] : Psychiatric [FreeTextEntry2] : except as above [FreeTextEntry6] : except as above [FreeTextEntry5] : except as above

## 2024-03-05 NOTE — REASON FOR VISIT
[Follow-Up - Clinic] : a clinic follow-up of [FreeTextEntry1] : 78 year old male with past medical history of HTN, partial lobectomy October 2013. He still complains of mucous in his chest. No chest pain. No orthopnea, PND, edema.Has not been seeing Dr. Dos Santos's office.   EKG today: SR at 76 bpm with RBBB - no change from prior.  Labs 08/26/2023: Cr 1.59; GFR 44; Total chol 206; ; HDL 67;  PET/CT Chest: Small left lower lobe nodular density demonstrates intermediate grade activity. It is probably inflammatory but the posttibiliy of lower grade maliganancy cannot be entirely excluded.  ZioXT 12/27/2022 - 12/30/2022: SR at average HR of 79 bpm with RBBB. Occasional SVE/VE.   EXSE 01/18/2023: Thang protocol 4:00 min. No EKG changes and No WMA on post exercise images. Reached 104% of MPHR.  TTE 01/18/2023: LVEF 51%. Mild LVH. Normal RV size/systolic function. Mild to moderate AR. Mildly dilated Aortic root measuring 3.9 cm and mildly dilated asending aorta measuring 3.8 cm. RVSP 23 mm Hg. Trace TR. Trace GA.   EKG Oct 2022: NSR at 89 bpm with RBBB. LVH - no change from prior  Labs 03/23/2022: GFR 53; Cr 1.29; CBC WNL; rest of CMP WNL  EXSE Aug 8th 2017: 6 min; No EKG changes; No WMA; Resting echo: Normal LVEF 55%; Mild to moderate AR; Borderline dilated aortic root 4.0 cm; No other significant valvular abnormalities.  U/s Abd: No abd Aortic aneurysm; Normal Abdominal aorta dimensions.   EKG 08/2017: NSR at 76 bpm with RBBB. LVH - no change from prior  Oct 2022 CT: No change in diffuse tortuousity and ectasia of the aorta with fusiform aneurysmal dilatation of the ascending aorta up to 4.6 cm AP. CAT scan of the chest which reveals marked resolution of the 5 cm cavitary lesion in the right midlung field. Patient is on Breo. To get repeat CT scan in 6 months per Pulm.   PFT reveals moderate restriction with a marked reduction in diffusion capacity to 47% of predicted  Of note, CT chest states mild dilatation of thoracic ascending aorta (4.4 cm); however PMD notes state abd aortic aneurysm.  Labs June 2017: A1C 5.6; Total chol 141; ; HDL 45 LDL 61; CMP WNL  EXSE Oct 2013: Exercised for 8 minutes and 12 seconds on a Thang protocol. No significant EKG changes noted and no wall motion abnormalities noted on stress echocardiogram.   Resting echocardiogram showed Mild to moderate Aortic Regurgitation (ERO 0.04 sq cm), RF 12%, and RVol of 9 mL, VC 0.3 cm, Mild TR, Mild MR, No GA, PASP 33 mmHg, and Grade 1 Diastolic Dysfunction. Normal LV systolic function with LVEF estimated to be 55-60%.

## 2024-03-22 ENCOUNTER — APPOINTMENT (OUTPATIENT)
Dept: PULMONOLOGY | Facility: CLINIC | Age: 79
End: 2024-03-22
Payer: MEDICARE

## 2024-03-22 VITALS
BODY MASS INDEX: 23.54 KG/M2 | OXYGEN SATURATION: 98 % | HEART RATE: 81 BPM | SYSTOLIC BLOOD PRESSURE: 140 MMHG | DIASTOLIC BLOOD PRESSURE: 80 MMHG | WEIGHT: 150 LBS | HEIGHT: 67 IN

## 2024-03-22 DIAGNOSIS — R05.3 CHRONIC COUGH: ICD-10-CM

## 2024-03-22 PROCEDURE — 99214 OFFICE O/P EST MOD 30 MIN: CPT

## 2024-03-22 NOTE — HISTORY OF PRESENT ILLNESS
[TextBox_4] : Patient is a former smoker but quit more than 40 years ago.  He has a history of a left lower lobectomy in 2013 although the details are unclear.  He continues to complain of mucus which is white in his throat and upper chest region.  He has trouble removing the mucus.  He denies real cough.  He just feels mucus is stuck in that area.  I sent the patient to ENT last year and he ended up having a swallow test.  Unfortunately he does not know the results.  He remains on Anoro.  He is continuing to have the same complaint of mucus in the throat and upper chest region.  His last CAT scan as I can tell was 2 years ago.

## 2024-03-22 NOTE — ASSESSMENT
[FreeTextEntry1] : The etiology of this patient's complaint of mucus in his throat and upper chest region is unclear.  Patient does not have a definite cough.  I feel this is likely related to an ENT source.  I will order a CAT scan of the chest as a 2-year follow-up.  Patient will be referred again to ENT and hopefully he will get some answers this time.

## 2024-03-22 NOTE — PHYSICAL EXAM
[Normal Oropharynx] : normal oropharynx [No Acute Distress] : no acute distress [Normal Appearance] : normal appearance [No Neck Mass] : no neck mass [Normal Rate/Rhythm] : normal rate/rhythm [Normal S1, S2] : normal s1, s2 [No Murmurs] : no murmurs [No Resp Distress] : no resp distress [Clear to Auscultation Bilaterally] : clear to auscultation bilaterally [No Abnormalities] : no abnormalities [Benign] : benign [Normal Gait] : normal gait [No Clubbing] : no clubbing [No Cyanosis] : no cyanosis [No Edema] : no edema [FROM] : FROM [Normal Color/ Pigmentation] : normal color/ pigmentation [Oriented x3] : oriented x3 [No Focal Deficits] : no focal deficits [Normal Affect] : normal affect

## 2024-04-18 ENCOUNTER — RESULT REVIEW (OUTPATIENT)
Age: 79
End: 2024-04-18

## 2024-09-12 ENCOUNTER — APPOINTMENT (OUTPATIENT)
Dept: HEART AND VASCULAR | Facility: CLINIC | Age: 79
End: 2024-09-12
Payer: MEDICARE

## 2024-09-12 VITALS
HEIGHT: 67 IN | HEART RATE: 80 BPM | WEIGHT: 150 LBS | DIASTOLIC BLOOD PRESSURE: 96 MMHG | BODY MASS INDEX: 23.54 KG/M2 | OXYGEN SATURATION: 98 % | SYSTOLIC BLOOD PRESSURE: 160 MMHG | RESPIRATION RATE: 16 BRPM

## 2024-09-12 DIAGNOSIS — R06.02 SHORTNESS OF BREATH: ICD-10-CM

## 2024-09-12 DIAGNOSIS — I10 ESSENTIAL (PRIMARY) HYPERTENSION: ICD-10-CM

## 2024-09-12 DIAGNOSIS — R07.9 CHEST PAIN, UNSPECIFIED: ICD-10-CM

## 2024-09-12 PROCEDURE — 99214 OFFICE O/P EST MOD 30 MIN: CPT

## 2024-09-12 PROCEDURE — G2211 COMPLEX E/M VISIT ADD ON: CPT

## 2024-09-12 NOTE — HISTORY OF PRESENT ILLNESS
[FreeTextEntry1] : cardiac f/u notes sob and episodes of CP c/o chronic cough saw pulmonary BP has been elevated

## 2024-09-12 NOTE — DISCUSSION/SUMMARY
[FreeTextEntry1] : stable exam, chart reviewed cp/sob- r/o ischemia, f/u stress echo HTN BP elevated, will start additional med from pcp today ENT eval for cough

## 2024-10-28 ENCOUNTER — APPOINTMENT (OUTPATIENT)
Dept: THORACIC SURGERY | Facility: CLINIC | Age: 79
End: 2024-10-28

## 2024-10-29 ENCOUNTER — NON-APPOINTMENT (OUTPATIENT)
Age: 79
End: 2024-10-29

## 2024-10-30 ENCOUNTER — NON-APPOINTMENT (OUTPATIENT)
Age: 79
End: 2024-10-30

## 2025-01-28 ENCOUNTER — APPOINTMENT (OUTPATIENT)
Dept: PULMONOLOGY | Facility: CLINIC | Age: 80
End: 2025-01-28
Payer: MEDICARE

## 2025-01-28 VITALS
HEIGHT: 67 IN | BODY MASS INDEX: 23.54 KG/M2 | SYSTOLIC BLOOD PRESSURE: 140 MMHG | DIASTOLIC BLOOD PRESSURE: 80 MMHG | OXYGEN SATURATION: 97 % | HEART RATE: 89 BPM | WEIGHT: 150 LBS

## 2025-01-28 DIAGNOSIS — R05.3 CHRONIC COUGH: ICD-10-CM

## 2025-01-28 PROCEDURE — 99214 OFFICE O/P EST MOD 30 MIN: CPT

## 2025-02-03 ENCOUNTER — APPOINTMENT (OUTPATIENT)
Dept: HEART AND VASCULAR | Facility: CLINIC | Age: 80
End: 2025-02-03

## 2025-02-05 ENCOUNTER — APPOINTMENT (OUTPATIENT)
Dept: PULMONOLOGY | Facility: CLINIC | Age: 80
End: 2025-02-05

## 2025-02-24 ENCOUNTER — APPOINTMENT (OUTPATIENT)
Dept: PULMONOLOGY | Facility: CLINIC | Age: 80
End: 2025-02-24
Payer: MEDICARE

## 2025-02-24 PROCEDURE — 94729 DIFFUSING CAPACITY: CPT

## 2025-02-24 PROCEDURE — 94060 EVALUATION OF WHEEZING: CPT

## 2025-02-24 PROCEDURE — 94727 GAS DIL/WSHOT DETER LNG VOL: CPT

## 2025-03-03 ENCOUNTER — APPOINTMENT (OUTPATIENT)
Dept: HEART AND VASCULAR | Facility: CLINIC | Age: 80
End: 2025-03-03

## 2025-03-13 ENCOUNTER — NON-APPOINTMENT (OUTPATIENT)
Age: 80
End: 2025-03-13

## 2025-03-13 ENCOUNTER — APPOINTMENT (OUTPATIENT)
Dept: HEART AND VASCULAR | Facility: CLINIC | Age: 80
End: 2025-03-13
Payer: MEDICARE

## 2025-03-13 VITALS
SYSTOLIC BLOOD PRESSURE: 115 MMHG | BODY MASS INDEX: 23.54 KG/M2 | HEART RATE: 88 BPM | HEIGHT: 67 IN | OXYGEN SATURATION: 97 % | RESPIRATION RATE: 16 BRPM | DIASTOLIC BLOOD PRESSURE: 60 MMHG | WEIGHT: 150 LBS

## 2025-03-13 DIAGNOSIS — R07.9 CHEST PAIN, UNSPECIFIED: ICD-10-CM

## 2025-03-13 PROCEDURE — G2211 COMPLEX E/M VISIT ADD ON: CPT

## 2025-03-13 PROCEDURE — 99214 OFFICE O/P EST MOD 30 MIN: CPT

## 2025-03-13 PROCEDURE — 93000 ELECTROCARDIOGRAM COMPLETE: CPT

## 2025-03-13 RX ORDER — MULTIVITAMIN
TABLET ORAL
Refills: 0 | Status: ACTIVE | COMMUNITY

## 2025-03-20 ENCOUNTER — APPOINTMENT (OUTPATIENT)
Dept: HEART AND VASCULAR | Facility: CLINIC | Age: 80
End: 2025-03-20
Payer: MEDICARE

## 2025-03-20 DIAGNOSIS — I10 ESSENTIAL (PRIMARY) HYPERTENSION: ICD-10-CM

## 2025-03-20 DIAGNOSIS — R06.02 SHORTNESS OF BREATH: ICD-10-CM

## 2025-03-20 PROCEDURE — 93320 DOPPLER ECHO COMPLETE: CPT

## 2025-03-20 PROCEDURE — 93351 STRESS TTE COMPLETE: CPT

## 2025-03-20 PROCEDURE — 93325 DOPPLER ECHO COLOR FLOW MAPG: CPT

## 2025-03-20 PROCEDURE — 99214 OFFICE O/P EST MOD 30 MIN: CPT

## 2025-07-09 ENCOUNTER — APPOINTMENT (OUTPATIENT)
Dept: PULMONOLOGY | Facility: CLINIC | Age: 80
End: 2025-07-09
Payer: MEDICARE

## 2025-07-09 VITALS
OXYGEN SATURATION: 98 % | HEART RATE: 79 BPM | DIASTOLIC BLOOD PRESSURE: 70 MMHG | SYSTOLIC BLOOD PRESSURE: 120 MMHG | WEIGHT: 150 LBS | HEIGHT: 67 IN | BODY MASS INDEX: 23.54 KG/M2

## 2025-07-09 PROCEDURE — 99214 OFFICE O/P EST MOD 30 MIN: CPT

## 2025-08-07 ENCOUNTER — RESULT REVIEW (OUTPATIENT)
Age: 80
End: 2025-08-07

## 2025-09-18 ENCOUNTER — APPOINTMENT (OUTPATIENT)
Dept: HEART AND VASCULAR | Facility: CLINIC | Age: 80
End: 2025-09-18
Payer: MEDICARE

## 2025-09-18 VITALS
SYSTOLIC BLOOD PRESSURE: 110 MMHG | OXYGEN SATURATION: 92 % | WEIGHT: 151 LBS | BODY MASS INDEX: 23.7 KG/M2 | HEIGHT: 67 IN | HEART RATE: 79 BPM | RESPIRATION RATE: 17 BRPM | DIASTOLIC BLOOD PRESSURE: 72 MMHG

## 2025-09-18 DIAGNOSIS — R07.9 CHEST PAIN, UNSPECIFIED: ICD-10-CM

## 2025-09-18 DIAGNOSIS — I77.810 THORACIC AORTIC ECTASIA: ICD-10-CM

## 2025-09-18 DIAGNOSIS — I45.10 UNSPECIFIED RIGHT BUNDLE-BRANCH BLOCK: ICD-10-CM

## 2025-09-18 DIAGNOSIS — I10 ESSENTIAL (PRIMARY) HYPERTENSION: ICD-10-CM

## 2025-09-18 DIAGNOSIS — I35.1 NONRHEUMATIC AORTIC (VALVE) INSUFFICIENCY: ICD-10-CM

## 2025-09-18 PROCEDURE — 99214 OFFICE O/P EST MOD 30 MIN: CPT

## 2025-09-18 PROCEDURE — 93000 ELECTROCARDIOGRAM COMPLETE: CPT

## 2025-09-18 PROCEDURE — G2211 COMPLEX E/M VISIT ADD ON: CPT
